# Patient Record
Sex: MALE | Race: WHITE | Employment: FULL TIME | URBAN - METROPOLITAN AREA
[De-identification: names, ages, dates, MRNs, and addresses within clinical notes are randomized per-mention and may not be internally consistent; named-entity substitution may affect disease eponyms.]

---

## 2018-03-22 PROBLEM — E66.9 OBESITY (BMI 30.0-34.9): Status: ACTIVE | Noted: 2018-03-22

## 2018-03-22 PROBLEM — M19.071 ARTHRITIS OF RIGHT FOOT: Status: ACTIVE | Noted: 2018-03-22

## 2018-05-31 ENCOUNTER — HOSPITAL ENCOUNTER (OUTPATIENT)
Dept: GENERAL RADIOLOGY | Age: 58
Discharge: HOME | End: 2018-05-31
Payer: COMMERCIAL

## 2018-05-31 DIAGNOSIS — M25.572 ACUTE LEFT ANKLE PAIN: ICD-10-CM

## 2018-05-31 PROCEDURE — 73610 X-RAY EXAM OF ANKLE: CPT

## 2018-06-01 NOTE — PROGRESS NOTES
X-ray showed no fracture of soft tissue abnormality. Did reveal posterior heel spur. This may or may not be causing the pain. Still waiting on labs to come back. If gout and inflammatory arthritis ruled out, may refer to Podiatry to further eval if pain does not improve. For now continue with POC as discussed. Will notify pt when labs are back.